# Patient Record
Sex: MALE | Race: WHITE
[De-identification: names, ages, dates, MRNs, and addresses within clinical notes are randomized per-mention and may not be internally consistent; named-entity substitution may affect disease eponyms.]

---

## 2019-12-22 ENCOUNTER — HOSPITAL ENCOUNTER (EMERGENCY)
Dept: HOSPITAL 8 - ED | Age: 64
Discharge: ANOTHER HOSPITAL | End: 2019-12-22
Payer: MEDICARE

## 2019-12-22 VITALS — SYSTOLIC BLOOD PRESSURE: 104 MMHG | DIASTOLIC BLOOD PRESSURE: 58 MMHG

## 2019-12-22 VITALS — HEIGHT: 70 IN | WEIGHT: 264.55 LBS | BODY MASS INDEX: 37.87 KG/M2

## 2019-12-22 DIAGNOSIS — R65.21: ICD-10-CM

## 2019-12-22 DIAGNOSIS — M54.5: ICD-10-CM

## 2019-12-22 DIAGNOSIS — Z87.891: ICD-10-CM

## 2019-12-22 DIAGNOSIS — A41.9: Primary | ICD-10-CM

## 2019-12-22 DIAGNOSIS — F15.90: ICD-10-CM

## 2019-12-22 DIAGNOSIS — I95.9: ICD-10-CM

## 2019-12-22 DIAGNOSIS — G06.2: ICD-10-CM

## 2019-12-22 DIAGNOSIS — M54.2: ICD-10-CM

## 2019-12-22 LAB
<PLATELET ESTIMATE>: ADEQUATE
<PLT MORPHOLOGY>: (no result)
ALBUMIN SERPL-MCNC: 2.5 G/DL (ref 3.4–5)
ALP SERPL-CCNC: 169 U/L (ref 45–117)
ALT SERPL-CCNC: 56 U/L (ref 12–78)
ANION GAP SERPL CALC-SCNC: 9 MMOL/L (ref 5–15)
APTT BLD: 42 SECONDS (ref 25–31)
BAND#(MANUAL): 3.39 X10^3/UL
BASOPHILS NFR BLD MANUAL: 1 % (ref 0–1)
BASOS#(MANUAL): 0.21 X10^3/UL (ref 0–0.1)
BILIRUB DIRECT SERPL-MCNC: NORMAL MG/DL
BILIRUB SERPL-MCNC: 0.5 MG/DL (ref 0.2–1)
CALCIUM SERPL-MCNC: 8.6 MG/DL (ref 8.5–10.1)
CHLORIDE SERPL-SCNC: 103 MMOL/L (ref 98–107)
CREAT SERPL-MCNC: 0.91 MG/DL (ref 0.7–1.3)
CULTURE INDICATED?: NO
DOHLE BOD BLD QL SMEAR: (no result)
ERYTHROCYTE [DISTWIDTH] IN BLOOD BY AUTOMATED COUNT: 13.8 % (ref 9.4–14.8)
ERYTHROCYTE [SEDIMENTATION RATE] IN BLOOD BY WESTERGREN METHOD: 66 MM/HR (ref 0–10)
HCT (SEDRATE): 39.6 % (ref 39.2–51.8)
INR PPP: 1 (ref 0.93–1.1)
LYMPH#(MANUAL): 2.33 X10^3/UL (ref 1–3.4)
LYMPHS% (MANUAL): 11 % (ref 22–44)
MCH RBC QN AUTO: 31.1 PG (ref 27.5–34.5)
MCHC RBC AUTO-ENTMCNC: 32.9 G/DL (ref 33.2–36.2)
MCV RBC AUTO: 94.6 FL (ref 81–97)
MD: YES
MICROSCOPIC: (no result)
MONOS#(MANUAL): 1.06 X10^3/UL (ref 0.3–2.7)
MONOS% (MANUAL): 5 % (ref 2–9)
NEUTS BAND NFR BLD: 16 % (ref 0–7)
O2 FLOW: 5 L/MIN
PLATELET # BLD AUTO: 134 X10^3/UL (ref 130–400)
PMNS WITH VACUOLES: (no result)
PMV BLD AUTO: 7.7 FL (ref 7.4–10.4)
PROT SERPL-MCNC: 6.7 G/DL (ref 6.4–8.2)
PROTHROMBIN TIME: 10.5 SECONDS (ref 9.6–11.5)
RBC # BLD AUTO: 4.19 X10^6/UL (ref 4.38–5.82)
SEG#(MANUAL): 14.2 X10^3/UL (ref 1.8–6.8)
SEGS% (MANUAL): 67 % (ref 42–75)
TOXIC GRAN: (no result)

## 2019-12-22 PROCEDURE — 87147 CULTURE TYPE IMMUNOLOGIC: CPT

## 2019-12-22 PROCEDURE — 87077 CULTURE AEROBIC IDENTIFY: CPT

## 2019-12-22 PROCEDURE — 87040 BLOOD CULTURE FOR BACTERIA: CPT

## 2019-12-22 PROCEDURE — 71045 X-RAY EXAM CHEST 1 VIEW: CPT

## 2019-12-22 PROCEDURE — 96361 HYDRATE IV INFUSION ADD-ON: CPT

## 2019-12-22 PROCEDURE — 85610 PROTHROMBIN TIME: CPT

## 2019-12-22 PROCEDURE — 81001 URINALYSIS AUTO W/SCOPE: CPT

## 2019-12-22 PROCEDURE — 96375 TX/PRO/DX INJ NEW DRUG ADDON: CPT

## 2019-12-22 PROCEDURE — 93005 ELECTROCARDIOGRAM TRACING: CPT

## 2019-12-22 PROCEDURE — 36556 INSERT NON-TUNNEL CV CATH: CPT

## 2019-12-22 PROCEDURE — 85730 THROMBOPLASTIN TIME PARTIAL: CPT

## 2019-12-22 PROCEDURE — 87186 SC STD MICRODIL/AGAR DIL: CPT

## 2019-12-22 PROCEDURE — 85025 COMPLETE CBC W/AUTO DIFF WBC: CPT

## 2019-12-22 PROCEDURE — 84145 PROCALCITONIN (PCT): CPT

## 2019-12-22 PROCEDURE — 51702 INSERT TEMP BLADDER CATH: CPT

## 2019-12-22 PROCEDURE — 82803 BLOOD GASES ANY COMBINATION: CPT

## 2019-12-22 PROCEDURE — 85651 RBC SED RATE NONAUTOMATED: CPT

## 2019-12-22 PROCEDURE — 96368 THER/DIAG CONCURRENT INF: CPT

## 2019-12-22 PROCEDURE — 36600 WITHDRAWAL OF ARTERIAL BLOOD: CPT

## 2019-12-22 PROCEDURE — 83605 ASSAY OF LACTIC ACID: CPT

## 2019-12-22 PROCEDURE — 96366 THER/PROPH/DIAG IV INF ADDON: CPT

## 2019-12-22 PROCEDURE — 80053 COMPREHEN METABOLIC PANEL: CPT

## 2019-12-22 PROCEDURE — 96365 THER/PROPH/DIAG IV INF INIT: CPT

## 2019-12-22 PROCEDURE — 99291 CRITICAL CARE FIRST HOUR: CPT

## 2019-12-22 PROCEDURE — 36415 COLL VENOUS BLD VENIPUNCTURE: CPT

## 2019-12-22 NOTE — NUR
CALLED PHARMACY REGARDING RESTARTING VANCO, PER PHARMACY OK TO RESTART.  PT 
TOLERATED LEVO WELL /59.

## 2019-12-22 NOTE — NUR
REPORT TO [facility] MED[facility], PT MOVED TO EMS [facility]. CARE TRANSFERRED TO EMS [facility]. 
BELONGINGS W/ PT. UPDATED [facility] ON PT LEAVING. AS

## 2019-12-22 NOTE — NUR
PT MORE ALERT AND TALKATIVE.  ASKED FOR THIS RN TO SCRATCH NOSE, UNABLE TO MOVE 
ARMS AND STATES HE DID NOT FEEL SHAIKH INSERTION, MD AWARE AND NOTIFIED.  PT CAN 
FEEL ABOVE NIPPLE LINE

## 2019-12-22 NOTE — NUR
PT BACK FROM MRI, LOW BP, RESP RATE 25-30, SATS 96% ON 5LNC. 84% ON 2LN 
INCREASE TO 5LNC  PT A&0x4, STATES HE IS NUMB,  MOVED PT TO TRAUMA ROOM 

-------------------------------------------------------------------------------

Addendum: 12/22/19 at 1632 by JENNIFER

-------------------------------------------------------------------------------

ASSUMED CARE AT THIS TIME

-------------------------------------------------------------------------------

Addendum: 12/22/19 at 1638 by [email or web address]

-------------------------------------------------------------------------------

ASSUMED CARE AT THIS TIME

## 2019-12-22 NOTE — NUR
PT RESTING IN ROOM WITH EYES CLOSED. VSS. PT MEDICATED PER MAR. UA COLLECTED 
FROM EXISTING SHAIKH CATHETER USING STERILE TECHNIQUE. NO NEEDS EXPRESSED. CALL 
LIGHT WITHIN REACH. AWAITING. MRI.

## 2019-12-22 NOTE — NUR
late entry 1345: mri called requesting more pain medication. pt given 50mcg 
fentanyl prior to leaving for mri. another dose of 50mcg fentanyl administered 
while in mri (2 doses of 25mcg given). d/t increased o2 requirements, this rn 
stayed in mri to monitor pt. mri scanner malfunctioning and unable to complete 
scan. on the way back from mri, pt became hypotensive and hypoxic and began 
stating that he was numb. Dr. Hampton and trauma RN to bs. second piv established 
with us guidance. ivf boluses infusing per verbal orders from Dr. Hampton. pt 
moved to trauma room 4.

## 2019-12-22 NOTE — NUR
PT TO BE CAREFLIGHTED TO [facility], AWAITING HELICOPTER. VSS, NO CHANGE IN 
CONDITION. A&OX4 GCS 15. ANSWERS QUESTIONS APPROPRIATELY. HX CATARACTS, UNABLE 
TO VISUALIZE PUPIL CONSTRICTION. SENSATION IN FACE AND UPPER TORSO TO JUST 
BELOW NIPPLE LINE. DOES NOT WITHDRAW TO PAIN IN LOWER/UPPER EXTREM. NSR ON 
MONITOR. LUNG SOUNDS DIMINISHED L LUNG. ABD ROUNDED AND SOFT, BS PRESENT. SHAIKH 
DRAINING YELLOW URINE W/ SOME SMALL BLOOD CLOTS. MONITORING PRESSORS CLOSELY. 
AS

## 2019-12-23 NOTE — NUR
PT WITH POSITIVE BLOOD CULTURE RESULTS.  PT HAS BEEN TRANSFERRED TO [facility] ICU.  THIS WAS CALLED TO THE PRIMARY NURSE AS WELL AS FAXED TO THEM 
PER THEIR REQUEST FOR CONTINUITY OF CARE.